# Patient Record
(demographics unavailable — no encounter records)

---

## 2025-02-12 NOTE — HISTORY OF PRESENT ILLNESS
[de-identified] : ELLIE ACE is a 53 year old male with a PMH of Cirrhosis, Ascites, AUD and Cocaine use.   2/10/25: He presents today for evaluation of newly diagnosed cirrhosis, accompanied by a friend. Records reviewed, including notes, labs, imaging, etc. s/p [facility] hospitalization in early January 2025 for   anemia. Found on imaging to have cirrhosis, ascites and evidence of portal HTN. Has history of excessive alcohol abuse and cocaine use. Was in inpatient substance abuse rehab in 12/2024 which he completed and is now on Vivitrol.  He was started on Furosemide 40 mg QD and Spironolactone 100 mg QD which he continues to take, denies abdominal distension. Last LVP early January, removed 2100 ml. He was placed on Carvedilol 3.125 mg BID, HR 70 bpm today. He is pending EGD. Denies hematemesis or black stools. Denies confusion. Has 2-3 BMs/day. Not on Lactulose or Xifaxan.    Labs 1/20/25 - h/h 9.3/29.4, INR 1.39, tbili 1.9, AST 84, ALT 40, . Iron saturation 15%. JOE+, SMA+ (1:40). AMA neg. Viral hepatitis A, B and C negative.  CT A/P 1/1/25 - no focal hepatic lesion

## 2025-02-12 NOTE — ADDENDUM
[FreeTextEntry1] : I, Kendal Long NP, acted as scribe for GODFREY Gorman for this patient encounter.

## 2025-02-12 NOTE — PHYSICAL EXAM
[Non-Tender] : non-tender [General Appearance - Alert] : alert [Sclera] : the sclera and conjunctiva were normal [Bowel Sounds] : normal bowel sounds [Abdomen Soft] : soft [Abdomen Tenderness] : non-tender [] : no hepato-splenomegaly [Abdomen Mass (___ Cm)] : no abdominal mass palpated [Oriented To Time, Place, And Person] : oriented to person, place, and time [Scleral Icterus] : No Scleral Icterus [Spider Angioma] : No spider angioma(s) were observed [Abdominal  Ascites] : no ascites [Asterixis] : no asterixis observed [Jaundice] : No jaundice [Palmar Erythema] : no Palmar Erythema [Depression] : no depression

## 2025-02-12 NOTE — HISTORY OF PRESENT ILLNESS
[de-identified] : ELLIE ACE is a 53 year old male with a PMH of Cirrhosis, Ascites, AUD and Cocaine use.   2/10/25: He presents today for evaluation of newly diagnosed cirrhosis, accompanied by a friend. Records reviewed, including notes, labs, imaging, etc. s/p [facility] hospitalization in early January 2025 for   anemia. Found on imaging to have cirrhosis, ascites and evidence of portal HTN. Has history of excessive alcohol abuse and cocaine use. Was in inpatient substance abuse rehab in 12/2024 which he completed and is now on Vivitrol.  He was started on Furosemide 40 mg QD and Spironolactone 100 mg QD which he continues to take, denies abdominal distension. Last LVP early January, removed 2100 ml. He was placed on Carvedilol 3.125 mg BID, HR 70 bpm today. He is pending EGD. Denies hematemesis or black stools. Denies confusion. Has 2-3 BMs/day. Not on Lactulose or Xifaxan.    Labs 1/20/25 - h/h 9.3/29.4, INR 1.39, tbili 1.9, AST 84, ALT 40, . Iron saturation 15%. JOE+, SMA+ (1:40). AMA neg. Viral hepatitis A, B and C negative.  CT A/P 1/1/25 - no focal hepatic lesion

## 2025-02-12 NOTE — ASSESSMENT
[FreeTextEntry1] : ELLIE ACE is a 53 year old male with a PMH of Cirrhosis, Ascites, AUD and Cocaine use.  Cirrhosis -dx 2025, based on imaging. -Etiology likely ETOH vs burnt out AIH (+SMA & JOE) -Disease progression of cirrhosis discussed, including but not limited to hepatocellular carcinoma, varices with or without bleeding, hepatic encephalopathy, ascites, liver failure and death.   HCC Screening -I have explained the need for imaging every 6 months to assess for HCC. -CT A/P 1/1/25 - no focal hepatic lesion. AFP ordered.   Ascites -c/w Furosemide 40 mg QD and Spironolactone 100 mg QD -Pt was counseled to adhere to a low sodium (<2 grams of sodium per day) diet by - avoiding adding any salt to meals (removing the saltshaker from the table); eliminating salty foods from diet; eating more home-cooked meals; choosing fresh or frozen, not canned, vegetables and fruits; and reading ingredient and food labels to choose low sodium foods.   Variceal Screening -EGD pending scheduling, saw GI already -if pt experiences hematemesis, advised to go to ER immediately   Encephalopathy -notify provider if new onset confusion -at least 1-2 bms/day  AUD -Last drink 12/16/24, congratulated on long abstinence. -currently part of [facility], on Vivitrol. -Extensive discussion of the harmful effect of alcohol and emphasized the importance of continued alcohol abstinence. Discussed at length the importance of maintaining alcohol abstinence, including potential benefits of abstinence and potential risks including high risk of short-term and longer-term morbidity/mortality with continued alcohol. Also explained that if liver worsens, and transplant needed, complete abstinence is prerequisite to be considered.   Transplant -I have explained that disease can progress to the point of requiring an evaluation for liver transplantation. -MELDNa - 17   Health Maintenance -Can take up to 2G Tylenol per day. NO NSAIDs as these can lead to diuretic resistance and precipitate renal dysfunction in patients with advanced liver disease. -High Protein Low Fat Low Salt (up to 2G Na/day) Diet, no prolonged fasting, Mediterranean Diet recommended. -Continue to abstain from alcohol and all illicit drugs. -Avoid use of herbal and dietary supplements due to potential hepatotoxicity. -Avoid eating any unpasteurized dairy products; avoid eating any raw or undercooked eggs, fish, poultry, or meat; and avoid eating raw/steamed oysters or other shellfish to avoid risk of infection.   Follow up in 6 months w/labs and imaging

## 2025-02-28 NOTE — CONSULT LETTER
[Dear  ___] : Dear  [unfilled], [Consult Letter:] : I had the pleasure of evaluating your patient, [unfilled]. [Consult Closing:] : Thank you very much for allowing me to participate in the care of this patient.  If you have any questions, please do not hesitate to contact me. [Sincerely,] : Sincerely, [FreeTextEntry3] : Karuna Waggoner, JIMMY, ANP-c

## 2025-02-28 NOTE — HISTORY OF PRESENT ILLNESS
[de-identified] : Referred by: PCP PCP: Dr. Mora   GUEVARA ACE presented at age 53 year on 2025 for evaluation of anemia and thrombocytopenia.  Guevara was in [facility] recently with severe anemia, required transfusion of 5 units PRBC's. He has a h/o ETOH abuse, stopped drinking as of Dec. 2024. He is getting a monthly Vivitrol injection and completed detox program. He is also seeing Hepatology. Laboratory studies reviewed and notable for: HGB= 8.9, HCT= 28.6, WBC= 2.96, Plt= 69, s. iron= 55, TIBC= 354, iron %= 15%, ferritin= 10, B12= 1240.    HCM: - Colonoscopy: No- scheduled for 3/1/25   SH: - Occupation: NOT currently working - Smoking/Etoh/illicit drugs: Stopped ETOH 25, No smoking, + cocaine  FH: Mother-  age 80's, unknown Father-  age 80's, MI 1 half brother- alive and well 2 half sisters- alive and well

## 2025-02-28 NOTE — RESULTS/DATA
[FreeTextEntry1] : ELLIE ACE is a 53 year--old male who presents for initial evaluation of anemia and thrombocytopenia.

## 2025-02-28 NOTE — REVIEW OF SYSTEMS
[Fever] : no fever [Chills] : no chills [Night Sweats] : no night sweats [Fatigue] : no fatigue [Vision Problems] : no vision problems [Nosebleeds] : no nosebleeds [Chest Pain] : no chest pain [Palpitations] : no palpitations [Lower Ext Edema] : no lower extremity edema [Shortness Of Breath] : no shortness of breath [Wheezing] : no wheezing [Cough] : no cough [SOB on Exertion] : no shortness of breath during exertion [Abdominal Pain] : no abdominal pain [Vomiting] : no vomiting [Constipation] : no constipation [Joint Pain] : no joint pain [Joint Stiffness] : no joint stiffness [Muscle Weakness] : no muscle weakness [Skin Rash] : no skin rash [Skin Wound] : no skin wound [Dizziness] : no dizziness [Fainting] : no fainting [Insomnia] : no insomnia [Depression] : no depression [Easy Bleeding] : no tendency for easy bleeding [Easy Bruising] : no tendency for easy bruising [Swollen Glands] : no swollen glands

## 2025-06-24 NOTE — RESULTS/DATA
[FreeTextEntry1] : ELLIE ACE is a 53 year--old male who presents for follow up evaluation of anemia and thrombocytopenia.

## 2025-06-24 NOTE — CONSULT LETTER
[Dear  ___] : Dear  [unfilled], [Courtesy Letter:] : I had the pleasure of seeing your patient, [unfilled], in my office today. [Consult Closing:] : Thank you very much for allowing me to participate in the care of this patient.  If you have any questions, please do not hesitate to contact me. [Sincerely,] : Sincerely, [FreeTextEntry3] : Karuna Waggoner, JIMMY, ANP-c

## 2025-06-24 NOTE — HISTORY OF PRESENT ILLNESS
[de-identified] : Referred by: PCP PCP: Dr. Mora   GUEVARA ACE presented at age 53 year on 2025 for evaluation of anemia and thrombocytopenia.  Guevara was in [facility] recently with severe anemia, required transfusion of 5 units PRBC's. He has a h/o ETOH abuse, stopped drinking as of Dec. 2024. He is getting a monthly Vivitrol injection and completed detox program. He is also seeing Hepatology. Laboratory studies reviewed and notable for: HGB= 8.9, HCT= 28.6, WBC= 2.96, Plt= 69, s. iron= 55, TIBC= 354, iron %= 15%, ferritin= 10, B12= 1240.    HCM: - Colonoscopy: No- scheduled for 3/1/25   SH: - Occupation: NOT currently working - Smoking/Etoh/illicit drugs: Stopped ETOH 25, No smoking, + cocaine  FH: Mother-  age 80's, unknown Father-  age 80's, MI 1 half brother- alive and well 2 half sisters- alive and well    [de-identified] : Guevara is feeling well. Tolerated IV iron well.  Denies any dark stools, blood per rectum or hematuria.